# Patient Record
Sex: MALE | Race: WHITE | NOT HISPANIC OR LATINO | URBAN - NONMETROPOLITAN AREA
[De-identification: names, ages, dates, MRNs, and addresses within clinical notes are randomized per-mention and may not be internally consistent; named-entity substitution may affect disease eponyms.]

---

## 2020-11-18 ENCOUNTER — IMPORTED ENCOUNTER (OUTPATIENT)
Dept: URBAN - NONMETROPOLITAN AREA CLINIC 1 | Facility: CLINIC | Age: 70
End: 2020-11-18

## 2020-11-18 PROBLEM — H52.223: Noted: 2020-11-18

## 2020-11-18 PROBLEM — H52.03: Noted: 2020-11-18

## 2020-11-18 PROBLEM — H52.4: Noted: 2020-11-18

## 2020-11-18 PROBLEM — H25.13: Noted: 2020-11-18

## 2020-11-18 PROBLEM — E11.3293: Noted: 2020-11-18

## 2020-11-18 PROCEDURE — 92015 DETERMINE REFRACTIVE STATE: CPT

## 2020-11-18 PROCEDURE — 92004 COMPRE OPH EXAM NEW PT 1/>: CPT

## 2020-11-18 NOTE — PATIENT DISCUSSION
Compound Hyperopic Astigmatism Ou w/ Presbyopia-  discussed findings w/ patient-  new spectacle Rx issued today-  monitor yearly or prnCataracts-  discussed findings w/ patient-  UV protection recommended-  slow progression no treatment indicated at this time-  monitor yearly or prn Type II Diabetes w/ Mild Retinopathy-  discussed findings w/ patient -  controlled by gliperide-  Last A1C: 6.8 ( Novemeber 2020)-  Mild non-proliferatvie retinopathy noted negative edema-  emphasized importance of blood sugar control abd the effects on occualtr health-  discussed referral -  Notes to be sent to DR. Pavel Kimbrough - RTC 3 mo f/u w/ OCT Mac; Dr's Notes: MR 11/18/2020DFE 11/18/2020* Dr. Pavel Kimbrough Endocrinologist.

## 2022-03-18 PROBLEM — M50.00 CERVICAL DISC DISEASE WITH MYELOPATHY: Status: ACTIVE | Noted: 2020-01-17

## 2022-04-09 ASSESSMENT — VISUAL ACUITY
OU_SC: 20/25-
OS_SC: 20/30-
OD_SC: 20/30+
OU_CC: J1+

## 2022-04-09 ASSESSMENT — TONOMETRY
OS_IOP_MMHG: 16
OD_IOP_MMHG: 16

## 2022-04-25 ENCOUNTER — COMPREHENSIVE EXAM (OUTPATIENT)
Dept: URBAN - NONMETROPOLITAN AREA CLINIC 4 | Facility: CLINIC | Age: 72
End: 2022-04-25

## 2022-04-25 DIAGNOSIS — H52.03: ICD-10-CM

## 2022-04-25 PROCEDURE — 92015 DETERMINE REFRACTIVE STATE: CPT

## 2022-04-25 PROCEDURE — 92014 COMPRE OPH EXAM EST PT 1/>: CPT

## 2022-04-25 ASSESSMENT — TONOMETRY
OD_IOP_MMHG: 18
OS_IOP_MMHG: 18

## 2022-04-25 ASSESSMENT — VISUAL ACUITY
OD_CC: 20/25-2
OS_CC: 20/30+2
OS_BAT: 20/40
OD_BAT: 20/50-1

## 2023-02-01 RX ORDER — TOPIRAMATE 25 MG/1
25 TABLET ORAL 2 TIMES DAILY WITH MEALS
COMMUNITY

## 2023-02-01 RX ORDER — ASPIRIN 81 MG/1
81 TABLET ORAL DAILY
COMMUNITY

## 2023-02-01 RX ORDER — FENOFIBRATE 48 MG/1
48 TABLET, COATED ORAL DAILY
COMMUNITY

## 2023-02-01 RX ORDER — ROSUVASTATIN CALCIUM 20 MG/1
20 TABLET, COATED ORAL DAILY
COMMUNITY

## 2023-02-01 RX ORDER — CYCLOBENZAPRINE HCL 10 MG
10 TABLET ORAL 3 TIMES DAILY PRN
COMMUNITY
Start: 2020-01-18

## 2023-02-01 RX ORDER — POTASSIUM CITRATE 10 MEQ/1
10 TABLET, EXTENDED RELEASE ORAL
COMMUNITY

## 2023-02-01 RX ORDER — FLUTICASONE PROPIONATE AND SALMETEROL 250; 50 UG/1; UG/1
1 POWDER RESPIRATORY (INHALATION) EVERY 12 HOURS
COMMUNITY

## 2023-02-01 RX ORDER — LISINOPRIL 10 MG/1
10 TABLET ORAL DAILY
COMMUNITY

## 2023-02-01 RX ORDER — PAROXETINE HYDROCHLORIDE 20 MG/1
20 TABLET, FILM COATED ORAL DAILY
COMMUNITY

## 2023-02-01 RX ORDER — VALACYCLOVIR HYDROCHLORIDE 500 MG/1
500 TABLET, FILM COATED ORAL DAILY
COMMUNITY

## 2023-02-01 RX ORDER — TAMSULOSIN HYDROCHLORIDE 0.4 MG/1
0.4 CAPSULE ORAL DAILY
COMMUNITY

## 2023-05-02 ENCOUNTER — PREPPED CHART (OUTPATIENT)
Dept: URBAN - NONMETROPOLITAN AREA CLINIC 4 | Facility: CLINIC | Age: 73
End: 2023-05-02

## 2024-11-26 ENCOUNTER — COMPREHENSIVE EXAM (OUTPATIENT)
Dept: URBAN - NONMETROPOLITAN AREA CLINIC 4 | Facility: CLINIC | Age: 74
End: 2024-11-26

## 2024-11-26 DIAGNOSIS — H52.03: ICD-10-CM

## 2024-11-26 DIAGNOSIS — H52.223: ICD-10-CM

## 2024-11-26 DIAGNOSIS — H52.4: ICD-10-CM

## 2024-11-26 DIAGNOSIS — E11.9: ICD-10-CM

## 2024-11-26 DIAGNOSIS — H25.813: ICD-10-CM

## 2024-11-26 PROCEDURE — 92015 DETERMINE REFRACTIVE STATE: CPT

## 2024-11-26 PROCEDURE — 92014 COMPRE OPH EXAM EST PT 1/>: CPT

## 2025-04-24 ENCOUNTER — CONSULTATION/EVALUATION (OUTPATIENT)
Age: 75
End: 2025-04-24

## 2025-04-24 DIAGNOSIS — E11.9: ICD-10-CM

## 2025-04-24 DIAGNOSIS — H25.813: ICD-10-CM

## 2025-04-24 PROCEDURE — 92134 CPTRZ OPH DX IMG PST SGM RTA: CPT | Mod: NC

## 2025-04-24 PROCEDURE — 92025 CPTRIZED CORNEAL TOPOGRAPHY: CPT | Mod: NC

## 2025-04-24 PROCEDURE — 92136 OPHTHALMIC BIOMETRY: CPT

## 2025-04-24 PROCEDURE — 99214 OFFICE O/P EST MOD 30 MIN: CPT

## 2025-07-11 ENCOUNTER — PRE-OP/H&P (OUTPATIENT)
Age: 75
End: 2025-07-11

## 2025-07-11 VITALS
HEART RATE: 67 BPM | DIASTOLIC BLOOD PRESSURE: 68 MMHG | SYSTOLIC BLOOD PRESSURE: 112 MMHG | HEIGHT: 69.5 IN | BODY MASS INDEX: 22.44 KG/M2 | WEIGHT: 155 LBS

## 2025-07-11 DIAGNOSIS — E11.9: ICD-10-CM

## 2025-07-11 DIAGNOSIS — F41.9: ICD-10-CM

## 2025-07-11 DIAGNOSIS — J44.9: ICD-10-CM

## 2025-07-11 DIAGNOSIS — I10: ICD-10-CM

## 2025-07-11 DIAGNOSIS — Z01.818: ICD-10-CM

## 2025-07-11 DIAGNOSIS — N40: ICD-10-CM

## 2025-07-11 DIAGNOSIS — E78.2: ICD-10-CM

## 2025-07-11 PROCEDURE — 99213 OFFICE O/P EST LOW 20 MIN: CPT

## 2025-07-21 ENCOUNTER — POST-OP (OUTPATIENT)
Age: 75
End: 2025-07-21

## 2025-07-21 ENCOUNTER — SURGERY/PROCEDURE (OUTPATIENT)
Age: 75
End: 2025-07-21

## 2025-07-21 DIAGNOSIS — Z96.1: ICD-10-CM

## 2025-07-21 DIAGNOSIS — H25.812: ICD-10-CM

## 2025-07-21 PROCEDURE — 99024 POSTOP FOLLOW-UP VISIT: CPT

## 2025-07-21 PROCEDURE — 66984 XCAPSL CTRC RMVL W/O ECP: CPT

## 2025-07-25 ENCOUNTER — PRE-OP/H&P (OUTPATIENT)
Age: 75
End: 2025-07-25

## 2025-07-25 VITALS
HEART RATE: 64 BPM | HEIGHT: 69.5 IN | DIASTOLIC BLOOD PRESSURE: 78 MMHG | WEIGHT: 155 LBS | BODY MASS INDEX: 22.44 KG/M2 | SYSTOLIC BLOOD PRESSURE: 126 MMHG

## 2025-07-25 DIAGNOSIS — E78.2: ICD-10-CM

## 2025-07-25 DIAGNOSIS — I10: ICD-10-CM

## 2025-07-25 DIAGNOSIS — F41.9: ICD-10-CM

## 2025-07-25 DIAGNOSIS — Z01.818: ICD-10-CM

## 2025-07-25 DIAGNOSIS — J44.9: ICD-10-CM

## 2025-07-25 DIAGNOSIS — N40: ICD-10-CM

## 2025-07-25 DIAGNOSIS — E11.9: ICD-10-CM

## 2025-07-25 PROCEDURE — 99213 OFFICE O/P EST LOW 20 MIN: CPT | Mod: NC

## 2025-07-28 ENCOUNTER — POST-OP (OUTPATIENT)
Age: 75
End: 2025-07-28

## 2025-07-28 DIAGNOSIS — Z96.1: ICD-10-CM

## 2025-07-28 PROCEDURE — 99024 POSTOP FOLLOW-UP VISIT: CPT

## 2025-08-11 ENCOUNTER — SURGERY/PROCEDURE (OUTPATIENT)
Age: 75
End: 2025-08-11

## 2025-08-11 DIAGNOSIS — H25.811: ICD-10-CM

## 2025-08-11 PROCEDURE — 66984 XCAPSL CTRC RMVL W/O ECP: CPT | Mod: 79,RT

## 2025-08-12 ENCOUNTER — POST-OP (OUTPATIENT)
Age: 75
End: 2025-08-12

## 2025-08-12 DIAGNOSIS — Z96.1: ICD-10-CM

## 2025-08-12 PROCEDURE — 99024 POSTOP FOLLOW-UP VISIT: CPT

## 2025-08-29 ENCOUNTER — POST-OP (OUTPATIENT)
Age: 75
End: 2025-08-29

## 2025-08-29 DIAGNOSIS — Z96.1: ICD-10-CM

## 2025-08-29 PROCEDURE — 99024 POSTOP FOLLOW-UP VISIT: CPT
